# Patient Record
Sex: FEMALE | URBAN - METROPOLITAN AREA
[De-identification: names, ages, dates, MRNs, and addresses within clinical notes are randomized per-mention and may not be internally consistent; named-entity substitution may affect disease eponyms.]

---

## 2023-05-11 ENCOUNTER — OFFICE (OUTPATIENT)
Dept: URBAN - METROPOLITAN AREA CLINIC 106 | Facility: CLINIC | Age: 78
End: 2023-05-11

## 2023-05-11 VITALS — WEIGHT: 143.8 LBS

## 2023-05-11 DIAGNOSIS — Z80.0 FAMILY HISTORY OF COLON CANCER: ICD-10-CM

## 2023-05-11 DIAGNOSIS — I10 HYPERTENSION: ICD-10-CM

## 2023-05-11 DIAGNOSIS — Z83.3 DIABETES: ICD-10-CM

## 2023-05-11 DIAGNOSIS — E78.00 HYPERCHOLESTEREMIA: ICD-10-CM

## 2023-05-11 DIAGNOSIS — K76.0 NON-ALCOHOLIC FATTY LIVER: ICD-10-CM

## 2023-05-11 DIAGNOSIS — Z12.11 SCREENING FOR COLON CANCER: ICD-10-CM

## 2023-05-11 PROCEDURE — 99204 OFFICE O/P NEW MOD 45 MIN: CPT | Performed by: INTERNAL MEDICINE

## 2023-05-11 NOTE — SERVICEHPINOTES
The patient has a family history of colon [email or web address]  The patient denies any rectal bleeding, change in bowel movements or peristent abdominal pain.  Patient has had prior screenings.